# Patient Record
Sex: MALE | Race: WHITE | URBAN - METROPOLITAN AREA
[De-identification: names, ages, dates, MRNs, and addresses within clinical notes are randomized per-mention and may not be internally consistent; named-entity substitution may affect disease eponyms.]

---

## 2018-10-31 ENCOUNTER — HOSPITAL ENCOUNTER (INPATIENT)
Facility: CLINIC | Age: 53
Setting detail: SURGERY ADMIT
End: 2018-10-31
Attending: ORTHOPAEDIC SURGERY | Admitting: ORTHOPAEDIC SURGERY
Payer: COMMERCIAL

## 2018-11-08 RX ORDER — CEFAZOLIN SODIUM 1 G/3ML
1 INJECTION, POWDER, FOR SOLUTION INTRAMUSCULAR; INTRAVENOUS SEE ADMIN INSTRUCTIONS
Status: CANCELLED | OUTPATIENT
Start: 2018-11-08

## 2018-11-08 RX ORDER — PANTOPRAZOLE SODIUM 40 MG/1
40 TABLET, DELAYED RELEASE ORAL ONCE
Status: CANCELLED | OUTPATIENT
Start: 2018-11-08 | End: 2018-11-08

## 2018-11-08 RX ORDER — CEFAZOLIN SODIUM 2 G/100ML
2 INJECTION, SOLUTION INTRAVENOUS
Status: CANCELLED | OUTPATIENT
Start: 2018-11-08

## 2018-11-08 RX ORDER — CELECOXIB 200 MG/1
400 CAPSULE ORAL ONCE
Status: CANCELLED | OUTPATIENT
Start: 2018-11-08 | End: 2018-11-08

## 2018-12-06 ENCOUNTER — HOSPITAL ENCOUNTER (INPATIENT)
Facility: CLINIC | Age: 53
Setting detail: SURGERY ADMIT
End: 2018-12-06
Attending: ORTHOPAEDIC SURGERY | Admitting: ORTHOPAEDIC SURGERY
Payer: COMMERCIAL

## 2018-12-24 RX ORDER — HYDROCODONE BITARTRATE AND ACETAMINOPHEN 10; 325 MG/1; MG/1
1 TABLET ORAL EVERY 4 HOURS PRN
COMMUNITY
End: 2019-01-10 | Stop reason: HOSPADM

## 2018-12-24 SDOH — HEALTH STABILITY: MENTAL HEALTH: HOW OFTEN DO YOU HAVE A DRINK CONTAINING ALCOHOL?: NEVER

## 2018-12-26 NOTE — OR NURSING
Patient with many questions about [facility] infection rates. Spoke with Amparo Mccarty about this. She will contact patient with appropriate information.

## 2019-01-07 RX ORDER — CEFAZOLIN SODIUM 1 G/3ML
1 INJECTION, POWDER, FOR SOLUTION INTRAMUSCULAR; INTRAVENOUS SEE ADMIN INSTRUCTIONS
Status: CANCELLED | OUTPATIENT
Start: 2019-01-07

## 2019-01-07 RX ORDER — PANTOPRAZOLE SODIUM 40 MG/1
40 TABLET, DELAYED RELEASE ORAL ONCE
Status: CANCELLED | OUTPATIENT
Start: 2019-01-07 | End: 2019-01-07

## 2019-01-07 RX ORDER — CELECOXIB 200 MG/1
400 CAPSULE ORAL ONCE
Status: CANCELLED | OUTPATIENT
Start: 2019-01-07 | End: 2019-01-07

## 2019-01-07 RX ORDER — CEFAZOLIN SODIUM 2 G/100ML
2 INJECTION, SOLUTION INTRAVENOUS
Status: CANCELLED | OUTPATIENT
Start: 2019-01-07

## 2019-01-09 NOTE — PHARMACY-ADMISSION MEDICATION HISTORY
Medication reconciliation interview completed by pre-admitting nurse Kelsi Clay, reviewed by pharmacy. No further clarifications needed.       Prior to Admission medications    Medication Sig Last Dose Taking? Auth Provider   HYDROcodone-acetaminophen (NORCO)  MG per tablet Take 1 tablet by mouth every 4 hours as needed for severe pain  Yes Reported, Patient

## 2019-01-10 NOTE — OR NURSING
Dr. Darling reviewed chart.  He stated pt can not have surgery until his BP is in better control (BP at preop physical 1/4/19   182/100 and 190/94 & nurse practitioner Belén Ortiz noted uncontrolled hypertension on H & P).  Dr. Darling would also like pt to have an echocardiogram before surgery.  Dr. Fermin's office notified of above.  Belén Ortiz NP's office notified of above.  Belén out of office until 1/16/19.  Pt notified of above and was quite upset.  He said his BP is always high at the clinic, but within normal range when he takes it at home.  I told pt he must go back to clinic and be re-evaluated before surgery can proceed.  Pt then hung up on me.

## 2024-07-16 ENCOUNTER — TRANSCRIBE ORDERS (OUTPATIENT)
Dept: OTHER | Age: 59
End: 2024-07-16

## 2024-07-16 DIAGNOSIS — M25.361 KNEE INSTABILITY, RIGHT: Primary | ICD-10-CM

## 2024-07-16 DIAGNOSIS — M25.361 OTHER INSTABILITY, RIGHT KNEE: ICD-10-CM
